# Patient Record
Sex: FEMALE
[De-identification: names, ages, dates, MRNs, and addresses within clinical notes are randomized per-mention and may not be internally consistent; named-entity substitution may affect disease eponyms.]

---

## 2021-12-05 ENCOUNTER — HOSPITAL ENCOUNTER (INPATIENT)
Dept: HOSPITAL 5 - TRG | Age: 36
LOS: 2 days | Discharge: HOME | End: 2021-12-07
Attending: OBSTETRICS & GYNECOLOGY | Admitting: OBSTETRICS & GYNECOLOGY
Payer: COMMERCIAL

## 2021-12-05 ENCOUNTER — HOSPITAL ENCOUNTER (OUTPATIENT)
Dept: HOSPITAL 5 - TRG | Age: 36
Discharge: HOME | End: 2021-12-05
Attending: OBSTETRICS & GYNECOLOGY
Payer: SELF-PAY

## 2021-12-05 VITALS — SYSTOLIC BLOOD PRESSURE: 129 MMHG | DIASTOLIC BLOOD PRESSURE: 79 MMHG

## 2021-12-05 DIAGNOSIS — Z3A.38: ICD-10-CM

## 2021-12-05 DIAGNOSIS — Z20.822: ICD-10-CM

## 2021-12-05 LAB
ALBUMIN SERPL-MCNC: 3.2 G/DL (ref 3.9–5)
ALT SERPL-CCNC: 23 UNITS/L (ref 7–56)
BILIRUB UR QL STRIP: (no result)
BLOOD UR QL VISUAL: (no result)
BUN SERPL-MCNC: 8 MG/DL (ref 7–17)
BUN/CREAT SERPL: 20 %
CALCIUM SERPL-MCNC: 8.5 MG/DL (ref 8.4–10.2)
HCT VFR BLD CALC: 34.4 % (ref 30.3–42.9)
HEMOLYSIS INDEX: 215
HGB BLD-MCNC: 11.1 GM/DL (ref 10.1–14.3)
MCHC RBC AUTO-ENTMCNC: 32 % (ref 30–34)
MCV RBC AUTO: 79 FL (ref 79–97)
PH UR STRIP: 7 [PH] (ref 5–7)
PLATELET # BLD: 214 K/MM3 (ref 140–440)
PROT UR STRIP-MCNC: (no result) MG/DL
RBC # BLD AUTO: 4.37 M/MM3 (ref 3.65–5.03)
RBC #/AREA URNS HPF: < 1 /HPF (ref 0–6)
URATE SERPL-MCNC: 4.7 MG/DL (ref 3.5–7.6)
UROBILINOGEN UR-MCNC: < 2 MG/DL (ref ?–2)
WBC #/AREA URNS HPF: 2 /HPF (ref 0–6)

## 2021-12-05 PROCEDURE — 59025 FETAL NON-STRESS TEST: CPT

## 2021-12-05 PROCEDURE — 99211 OFF/OP EST MAY X REQ PHY/QHP: CPT

## 2021-12-05 PROCEDURE — 86592 SYPHILIS TEST NON-TREP QUAL: CPT

## 2021-12-05 PROCEDURE — 84550 ASSAY OF BLOOD/URIC ACID: CPT

## 2021-12-05 PROCEDURE — G0463 HOSPITAL OUTPT CLINIC VISIT: HCPCS

## 2021-12-05 PROCEDURE — 86850 RBC ANTIBODY SCREEN: CPT

## 2021-12-05 PROCEDURE — 86901 BLOOD TYPING SEROLOGIC RH(D): CPT

## 2021-12-05 PROCEDURE — 85027 COMPLETE CBC AUTOMATED: CPT

## 2021-12-05 PROCEDURE — 83615 LACTATE (LD) (LDH) ENZYME: CPT

## 2021-12-05 PROCEDURE — 36415 COLL VENOUS BLD VENIPUNCTURE: CPT

## 2021-12-05 PROCEDURE — U0003 INFECTIOUS AGENT DETECTION BY NUCLEIC ACID (DNA OR RNA); SEVERE ACUTE RESPIRATORY SYNDROME CORONAVIRUS 2 (SARS-COV-2) (CORONAVIRUS DISEASE [COVID-19]), AMPLIFIED PROBE TECHNIQUE, MAKING USE OF HIGH THROUGHPUT TECHNOLOGIES AS DESCRIBED BY CMS-2020-01-R: HCPCS

## 2021-12-05 PROCEDURE — 86900 BLOOD TYPING SEROLOGIC ABO: CPT

## 2021-12-05 PROCEDURE — 81001 URINALYSIS AUTO W/SCOPE: CPT

## 2021-12-05 PROCEDURE — 80053 COMPREHEN METABOLIC PANEL: CPT

## 2021-12-05 RX ADMIN — OXYTOCIN SCH MLS/HR: 10 INJECTION, SOLUTION INTRAMUSCULAR; INTRAVENOUS at 18:41

## 2021-12-05 NOTE — HISTORY AND PHYSICAL REPORT
History of Present Illness


Date of examination: 21


Date of admission: 


2021


Chief complaint: 


Contractions





History of present illness: 


36 year old female  presents to L&D with complaint of contractions.


Patient denies leaking of fluid or vaginal bleeding. Patient reports active 

fetal movement.


Patient received prenatal care at [facility] and prenatal records are 

available.


LMP 3/13/21. EDC 21.


Blood type O+, antibody screen negative, rubella nonimmune, pap smear negative, 

gonorrhea/chlamydia negative, AFP negative, NIPS normal, 1 hour sugar test 121, 

GBS negative, HIV negative, RPR nonreactive, hepatitis B surface antigen 

negative.











Past History


Past Medical History: other (obesity)


Past Surgical History: no surgical history


GYN History: denies: chlamydia, gonorrhea, hepatitis B, hepatitis C, herpes, 

HIV, syphilis, trichomonas


Family/Genetic History: none


Social history: lives with family, full code.  denies: smoking, alcohol abuse, 

prescription drug abuse, IV drug use





- Obstetrical History


Expected Date of Delivery: 21


Actual Gestation: 38 Week(s) 1 Day(s) 


: 3


Para: 2


Hx # Term Pregnancies: 2


Number of  Pregnancies: 0


Spontaneous Abortions: 0


Induced : 0


Number of Living Children: 2





Medications and Allergies


                                    Allergies











Allergy/AdvReac Type Severity Reaction Status Date / Time


 


No Known Allergies Allergy   Unverified 21 03:11











Active Meds: 


Active Medications





Acetaminophen (Acetaminophen 325 Mg Tab)  650 mg PO Q4H PRN


   PRN Reason: Pain, Mild (1-3)


Butorphanol Tartrate (Butorphanol 2 Mg/1 Ml Inj)  1 mg IV Q2H PRN


   PRN Reason: Pain, Moderate(4-6) LABOR PAIN


Carboprost Tromethamine (Carboprost Tromethamine 250 Mcg/1 Ml Inj)  250 mcg IM 

ONCE PRN


   PRN Reason: Uterine Bleeding


Ephedrine Sulfate (Ephedrine Sulfate 50 Mg/1 Ml Inj)  10 mg IV Q2M PRN


   PRN Reason: Hypotension


Fentanyl (Fentanyl 100 Mcg/2 Ml Inj)  100 mcg IV Q2H PRN


   PRN Reason: Pain,Severe (7-10) LABOR PAIN


Oxytocin/Sodium Chloride (Pitocin/Ns 30 Unit/500ml)  30 units in 500 mls @ 2 

mls/hr IV TITR KYRIE; Protocol


Lactated Ringer's (Lactated Ringers)  1,000 mls @ 125 mls/hr IV AS DIRECT KYRIE


Oxytocin/Sodium Chloride (Pitocin/Ns 30 Unit/500ml)  30 units in 500 mls @ 40 

mls/hr IV TITR KYRIE; Protocol


Labetalol HCl (Labetalol 100 Mg Tab)  100 mg PO BID KYRIE


Lidocaine (Lidocaine (2%) 20 Mg/1 Ml Vial 20 Ml Mdv)  20 ml INFILTRATI ONCE ONE


   Stop: 21 17:01


Loperamide HCl (Loperamide 2 Mg Cap)  2 mg PO ONCE PRN


   PRN Reason: give with Hemabate


Mineral Oil (Mineral Oil 30 Ml Oral Liqd)  30 ml PO QHS PRN


   PRN Reason: Constipation


Misoprostol (Misoprostol 200 Mcg Tab)  800 mcg OR ONCE PRN


   PRN Reason: Uterine Bleeding


Oxytocin (Oxytocin 10 Unit/1 Ml Inj)  10 unit IM ONCE PRN


   PRN Reason: Uterine Bleeding


Terbutaline Sulfate (Terbutaline 1 Mg/1 Ml Inj)  0.25 mg SUB-Q ONCE PRN


   PRN Reason: Hyperstimulation/Hypertonicity











Review of Systems


All systems: negative (contractions, mild headache)





- Vital Signs


Vital signs: 


                                   Vital Signs











Pulse Ox


 


 99 


 


 21 13:20








                                        











Temp Pulse Resp BP Pulse Ox


 


 98.2 F   72   18   140/88   99 


 


 21 13:25  21 16:55  21 13:25  21 16:55  21 13:25














- Physical Exam


Abdomen: Positive: normal appearance, soft.  Negative: distention, tenderness, 

guarding, rigidity


Genitourinary (Female): Positive: normal external genitalia, normal perenium.  

Negative: perineal/vulvar lesions


Vagina: Positive: normal moisture


Uterus: Positive: enlarged.  Negative: tender


Anus/Rectum: Positive: normal perianal skin


Extremities: Negative: tenderness, edema





- Obstetrical


FHR: category 1


Uterine Contraction Monitor Mode: External


Cervical Dilatation: 4


Cervical Effacement Percentage: 50


Fetal station: Ballottable


Uterine Contraction Pattern: Irregular


Uterine Contraction Intensity: Mild





Results


Result Diagrams: 


                                 21 14:45





                                 21 14:45


                              Abnormal lab results











  21 Range/Units





  14:45 14:45 


 


MCH  25 L   (28-32)  pg


 


RDW  19.6 H   (13.2-15.2)  %


 


Potassium   5.2 H  (3.6-5.0)  mmol/L


 


Carbon Dioxide   17 L  (22-30)  mmol/L


 


Creatinine   0.4 L  (0.6-1.2)  mg/dL


 


AST   52 H  (5-40)  units/L


 


Alkaline Phosphatase   399 H  ()  units/L


 


Lactate Dehydrogenase   437 H  ()  units/L


 


Total Protein   6.2 L  (6.3-8.2)  g/dL


 


Albumin   3.2 L  (3.9-5)  g/dL








All other labs normal.








Assessment and Plan


A: Pregnancy at 38 weeks, 1 day gestation.


Labor.


GBS negative. 


Elevated blood pressure.


P: Admit.


EFM.


Preeclamptic labs.


Consulted with Dr. Mcmullen re: patient and elevated blood pressures and labs. 

Dr. Mcmullen orders to admit patient and augment labor.

## 2021-12-05 NOTE — EVENT NOTE
Date: 21


Pt evaluated after nurse came and notified me at the desk that pt has some vag 

bleeding.  Pt allowed to void in bedpan and AROM done with pelvic /-2 small 

amount of blood tinged fluid and 2 small clots passed in the bedpan.  FHR not 

tracing continuously due to maternal habitus and pitocin still at 4mu/min.  IUPC

placed and FSE placed.   FHR category I previously and if same remains, plan to 

augment by titrating pitocin upwards.  Pt declines epidural.  Will give IV pain 

med nubain now and zofran. Expect .

## 2021-12-06 LAB
HCT VFR BLD CALC: 32.6 % (ref 30.3–42.9)
HGB BLD-MCNC: 10.1 GM/DL (ref 10.1–14.3)
MCHC RBC AUTO-ENTMCNC: 31 % (ref 30–34)
MCV RBC AUTO: 80 FL (ref 79–97)
PLATELET # BLD: 216 K/MM3 (ref 140–440)
RBC # BLD AUTO: 4.08 M/MM3 (ref 3.65–5.03)

## 2021-12-06 PROCEDURE — 10H07YZ INSERTION OF OTHER DEVICE INTO PRODUCTS OF CONCEPTION, VIA NATURAL OR ARTIFICIAL OPENING: ICD-10-PCS | Performed by: GENERAL PRACTICE

## 2021-12-06 PROCEDURE — 3E033VJ INTRODUCTION OF OTHER HORMONE INTO PERIPHERAL VEIN, PERCUTANEOUS APPROACH: ICD-10-PCS | Performed by: GENERAL PRACTICE

## 2021-12-06 RX ADMIN — DOCUSATE SODIUM SCH MG: 100 CAPSULE, LIQUID FILLED ORAL at 10:26

## 2021-12-06 RX ADMIN — IBUPROFEN SCH MG: 600 TABLET, FILM COATED ORAL at 12:30

## 2021-12-06 RX ADMIN — OXYTOCIN SCH MLS/HR: 10 INJECTION, SOLUTION INTRAMUSCULAR; INTRAVENOUS at 01:34

## 2021-12-06 RX ADMIN — FERROUS SULFATE TAB 325 MG (65 MG ELEMENTAL FE) SCH MG: 325 (65 FE) TAB at 10:26

## 2021-12-06 RX ADMIN — IBUPROFEN SCH MG: 600 TABLET, FILM COATED ORAL at 23:54

## 2021-12-06 RX ADMIN — DOCUSATE SODIUM SCH MG: 100 CAPSULE, LIQUID FILLED ORAL at 21:47

## 2021-12-06 RX ADMIN — Medication SCH EACH: at 10:26

## 2021-12-06 RX ADMIN — IBUPROFEN SCH MG: 600 TABLET, FILM COATED ORAL at 06:12

## 2021-12-06 RX ADMIN — IBUPROFEN SCH MG: 600 TABLET, FILM COATED ORAL at 18:14

## 2021-12-06 RX ADMIN — FERROUS SULFATE TAB 325 MG (65 MG ELEMENTAL FE) SCH MG: 325 (65 FE) TAB at 21:47

## 2021-12-06 NOTE — PROCEDURE NOTE
OB Delivery Note





- Delivery


Date of Delivery: 21


Surgeon: LESLIE CRISTINA


Estimated blood loss: other (700cc)





- Vaginal


Delivery presentation: vertex


Delivery position: OA


Intrapartum events: other(please specify) (vaginal bleeding with clots during 

labor)


Delivery augmentation: pitocin


Delivery monitor: external FHT, external uterine, internal FHT, internal uterine


Route of delivery: 


Delivery placenta: expressed


Delivery cord: 3 umbilical vessels


Episiotomy: none


Delivery laceration: none


Anesthesia: none


Delivery comments: 


After pt received pain med, early decels noted and pt complete.  Pt pushed well 

and SAVD of viable female infant then placenta delivered after 22min complete 

with 3vessel cord.  Pt had uterine atony and same treated with pitocin and 

cytotec 800mcg per rectum.  Pt sustained no vaginal laceration.  Unable to view 

the cervix due to poor lighting and short instruments with long vagina; Cervix 

palpated and clots removed from lower uterine segment.  Baby and patient stable.

 





- Infant


  ** A


Apgar at 1 minute: 9


Apgar at 5 minutes: 9


Infant Gender: Female (wt)

## 2021-12-07 VITALS — DIASTOLIC BLOOD PRESSURE: 86 MMHG | SYSTOLIC BLOOD PRESSURE: 123 MMHG

## 2021-12-07 RX ADMIN — Medication SCH EACH: at 10:20

## 2021-12-07 RX ADMIN — FERROUS SULFATE TAB 325 MG (65 MG ELEMENTAL FE) SCH MG: 325 (65 FE) TAB at 10:20

## 2021-12-07 RX ADMIN — DOCUSATE SODIUM SCH MG: 100 CAPSULE, LIQUID FILLED ORAL at 10:20

## 2021-12-07 RX ADMIN — IBUPROFEN SCH MG: 600 TABLET, FILM COATED ORAL at 05:45

## 2021-12-07 NOTE — DISCHARGE SUMMARY
Providers





- Providers


Date of Admission: 


21 17:25





Date of discharge: 21


Attending physician: 


OSCAR CRISTINA





Primary care physician: 


OSCAR CRISTINA








Hospitalization


Reason for admission: active labor, IUP at term


Delivery: 


Episiotomy: none


Laceration: none


Other postpartum procedures: none


Postpartum complications: other (Pt dev htn and was started on Labetalol with 

good b/p control.)


Discharge diagnosis: IUP at term delivered


Kuna baby: female


Hospital course: 





Pt was admitted in active labor and had a . She dev htn and was started on 

Labetalol 100mg bid with good relief. See H&P, delivery summary, and pp notes.


Condition at discharge: Stable


Disposition: 01 HOME / SELF CARE / HOMELESS





Plan





- Discharge Medications


Prescriptions: 


labetaloL [Labetalol 100mg TAB] 100 mg PO BID #90 tablet


Ibuprofen [Motrin 600 MG tab] 600 mg PO Q6HR PRN #30 tablet


 PRN Reason: Menstrual Cramps





- Provider Discharge Summary


Activity: routine, no sex for 6 weeks, no heavy lifting 4 weeks, no strenuous 

exercise


Diet: other (low Na)


Instructions: routine


Additional instructions: 


[]  Smoking cessation referral if applicable(refer to patient education folder 

for contact #)


[]  Refer to [facility] Booklet








Call your doctor immediately for:


* Fever > 100.5


* Heavy vaginal bleeding ( >1 pad per hour)


* Severe persistent headache


* Shortness of breath


* Reddened, hot, painful area to leg or breast


* Drainage or odor from incision.





* Keep incision clean and dry at all times and follow doctor's instructions r

egarding bathing/showering











- Follow up plan


Follow up: 


OSCAR CRISTINA MD [Primary Care Provider] - 6 Weeks